# Patient Record
Sex: FEMALE | Race: WHITE | URBAN - METROPOLITAN AREA
[De-identification: names, ages, dates, MRNs, and addresses within clinical notes are randomized per-mention and may not be internally consistent; named-entity substitution may affect disease eponyms.]

---

## 2019-04-24 ENCOUNTER — OUTPATIENT (OUTPATIENT)
Dept: OUTPATIENT SERVICES | Facility: HOSPITAL | Age: 49
LOS: 1 days | End: 2019-04-24
Payer: MEDICARE

## 2019-04-24 ENCOUNTER — APPOINTMENT (OUTPATIENT)
Dept: PODIATRY | Facility: HOSPITAL | Age: 49
End: 2019-04-24

## 2019-04-24 VITALS — RESPIRATION RATE: 14 BRPM | DIASTOLIC BLOOD PRESSURE: 82 MMHG | HEART RATE: 75 BPM | SYSTOLIC BLOOD PRESSURE: 120 MMHG

## 2019-04-24 DIAGNOSIS — Q66.6 OTHER CONGENITAL VALGUS DEFORMITIES OF FEET: ICD-10-CM

## 2019-04-24 DIAGNOSIS — Q90.9 DOWN SYNDROME, UNSPECIFIED: ICD-10-CM

## 2019-04-24 DIAGNOSIS — M79.609 PAIN IN UNSPECIFIED LIMB: ICD-10-CM

## 2019-04-24 DIAGNOSIS — M21.611 BUNION OF RIGHT FOOT: ICD-10-CM

## 2019-04-24 PROCEDURE — G0463: CPT

## 2019-04-24 PROCEDURE — 73630 X-RAY EXAM OF FOOT: CPT

## 2019-04-24 PROCEDURE — 73630 X-RAY EXAM OF FOOT: CPT | Mod: 26,50

## 2019-04-24 NOTE — ASSESSMENT
[FreeTextEntry1] : 49yo pt w/ bilateral hypermobile flatfeet w/ bunion deformity\par -Pt seen evaluated\par -Pt to receive custom molded orthotics & shoes in order to accommodate bunion deformity & alleviate bunion related pain\par -Discussed w/ pt that if unsuccessful conservative treatment is an option, surgery may be required but would need to have a reasonable at home situation in order to accommodate possible non weight bearing status dependent on procedure\par -Consent to return for callus debridement, #10 used to debride no incidents\par -XR ordered\par -RTC 3mos

## 2019-04-24 NOTE — PHYSICAL EXAM
[Skin Turgor] : normal skin turgor [] : no rash [Full Pulse] : the pedal pulses are present [FreeTextEntry1] : irritation to medial eminence b/l, no open lesions, no edema

## 2019-04-24 NOTE — HISTORY OF PRESENT ILLNESS
[FreeTextEntry1] : Pt presents w/ aide for evaluation of painful bunions b/l. Pt is nonverbal during evaluation, aide states that she has no medical issues other than MR. Pt has pain ambulating although does not necessarily convey the amount or level to her aides. Limited history available per aide.

## 2019-07-17 ENCOUNTER — APPOINTMENT (OUTPATIENT)
Dept: PODIATRY | Facility: HOSPITAL | Age: 49
End: 2019-07-17

## 2019-07-17 ENCOUNTER — OUTPATIENT (OUTPATIENT)
Dept: OUTPATIENT SERVICES | Facility: HOSPITAL | Age: 49
LOS: 1 days | End: 2019-07-17
Payer: MEDICARE

## 2019-07-17 VITALS
DIASTOLIC BLOOD PRESSURE: 75 MMHG | HEIGHT: 57 IN | BODY MASS INDEX: 25.24 KG/M2 | WEIGHT: 117 LBS | RESPIRATION RATE: 14 BRPM | HEART RATE: 78 BPM | SYSTOLIC BLOOD PRESSURE: 113 MMHG

## 2019-07-17 DIAGNOSIS — M21.612 BUNION OF RIGHT FOOT: ICD-10-CM

## 2019-07-17 DIAGNOSIS — M79.609 PAIN IN UNSPECIFIED LIMB: ICD-10-CM

## 2019-07-17 DIAGNOSIS — M21.611 BUNION OF RIGHT FOOT: ICD-10-CM

## 2019-07-17 DIAGNOSIS — L84 CORNS AND CALLOSITIES: ICD-10-CM

## 2019-07-17 DIAGNOSIS — Q66.6 OTHER CONGENITAL VALGUS DEFORMITIES OF FEET: ICD-10-CM

## 2019-07-17 PROCEDURE — G0463: CPT

## 2019-07-17 NOTE — PHYSICAL EXAM
[Full Pulse] : the pedal pulses are present [Skin Turgor] : normal skin turgor [] : no rash [FreeTextEntry1] : not able to assess based on nonverbal pt

## 2019-07-17 NOTE — ASSESSMENT
[FreeTextEntry1] : 49yo pt w/ bilateral hypermobile flatfeet w/ bunion deformity\par -Pt seen evaluated\par -Pt to receive custom molded orthotics & shoes in order to accommodate bunion deformity & alleviate bunion related pain\par -Discussed w/ pt that if unsuccessful conservative treatment is an option, surgery may be required but would need to have a reasonable at home situation in order to accommodate possible non weight bearing status dependent on procedure\par -Consent to return for callus debridement, #10 used to debrided no incidents\par -RTC 1 year

## 2019-07-19 DIAGNOSIS — M21.611 BUNION OF RIGHT FOOT: ICD-10-CM

## 2019-07-19 DIAGNOSIS — L84 CORNS AND CALLOSITIES: ICD-10-CM

## 2019-07-19 DIAGNOSIS — Q90.9 DOWN SYNDROME, UNSPECIFIED: ICD-10-CM

## 2019-07-19 DIAGNOSIS — Q66.6 OTHER CONGENITAL VALGUS DEFORMITIES OF FEET: ICD-10-CM

## 2019-11-25 ENCOUNTER — RESULT REVIEW (OUTPATIENT)
Age: 49
End: 2019-11-25

## 2019-11-25 ENCOUNTER — OUTPATIENT (OUTPATIENT)
Dept: OUTPATIENT SERVICES | Facility: HOSPITAL | Age: 49
LOS: 1 days | End: 2019-11-25

## 2019-11-25 ENCOUNTER — APPOINTMENT (OUTPATIENT)
Dept: OBGYN | Facility: HOSPITAL | Age: 49
End: 2019-11-25
Payer: MEDICARE

## 2019-11-25 ENCOUNTER — LABORATORY RESULT (OUTPATIENT)
Age: 49
End: 2019-11-25

## 2019-11-25 VITALS
HEIGHT: 58 IN | WEIGHT: 110 LBS | HEART RATE: 77 BPM | BODY MASS INDEX: 23.09 KG/M2 | SYSTOLIC BLOOD PRESSURE: 115 MMHG | DIASTOLIC BLOOD PRESSURE: 70 MMHG

## 2019-11-25 DIAGNOSIS — Z00.00 ENCOUNTER FOR GENERAL ADULT MEDICAL EXAMINATION W/OUT ABNORMAL FINDINGS: ICD-10-CM

## 2019-11-25 DIAGNOSIS — Q90.9 DOWN SYNDROME, UNSPECIFIED: ICD-10-CM

## 2019-11-25 PROCEDURE — 99202 OFFICE O/P NEW SF 15 MIN: CPT | Mod: 25

## 2019-11-26 DIAGNOSIS — Q90.9 DOWN SYNDROME, UNSPECIFIED: ICD-10-CM

## 2019-11-26 DIAGNOSIS — Z00.00 ENCOUNTER FOR GENERAL ADULT MEDICAL EXAMINATION WITHOUT ABNORMAL FINDINGS: ICD-10-CM

## 2019-11-26 LAB
C TRACH RRNA SPEC QL NAA+PROBE: SIGNIFICANT CHANGE UP
HPV HIGH+LOW RISK DNA PNL CVX: SIGNIFICANT CHANGE UP
N GONORRHOEA RRNA SPEC QL NAA+PROBE: SIGNIFICANT CHANGE UP
SPECIMEN SOURCE: SIGNIFICANT CHANGE UP

## 2019-11-30 LAB — CYTOLOGY SPEC DOC CYTO: SIGNIFICANT CHANGE UP

## 2019-12-16 NOTE — PHYSICAL EXAM
[Alert] : alert [No Lesions] : no genitalia lesions [Normal] : clitoris [Labia Majora] : labia major [Pink Rugae] : pink rugae [Pap Obtained] : a Pap smear was performed [Nulliparous] : was nulliparous [Normal Position] : in a normal position [RRR, No Murmurs] : RRR, no murmurs [CTAB] : CTAB [Acute Distress] : no acute distress [Mass] : no breast mass [Axillary LAD] : no axillary lymphadenopathy [Nipple Discharge] : no nipple discharge [Soft] : not soft [Tender] : non tender [Labia Majora Erythema] : no erythema of the labia majora [Labia Minora Erythema] : no erythema of the labia minora [Atrophy] : no atrophy [Cystocele] : no cystocele [Discharge] : no discharge [Motion Tenderness] : there was no cervical motion tenderness

## 2019-12-20 ENCOUNTER — APPOINTMENT (OUTPATIENT)
Dept: MAMMOGRAPHY | Facility: CLINIC | Age: 49
End: 2019-12-20

## 2020-02-11 ENCOUNTER — APPOINTMENT (OUTPATIENT)
Dept: MAMMOGRAPHY | Facility: CLINIC | Age: 50
End: 2020-02-11

## 2020-04-16 ENCOUNTER — APPOINTMENT (OUTPATIENT)
Dept: GASTROENTEROLOGY | Facility: CLINIC | Age: 50
End: 2020-04-16

## 2021-12-08 ENCOUNTER — APPOINTMENT (OUTPATIENT)
Dept: OBGYN | Facility: HOSPITAL | Age: 51
End: 2021-12-08

## 2022-04-12 ENCOUNTER — APPOINTMENT (OUTPATIENT)
Dept: OPHTHALMOLOGY | Facility: CLINIC | Age: 52
End: 2022-04-12

## 2022-05-10 ENCOUNTER — NON-APPOINTMENT (OUTPATIENT)
Age: 52
End: 2022-05-10

## 2022-05-10 ENCOUNTER — APPOINTMENT (OUTPATIENT)
Dept: OPHTHALMOLOGY | Facility: CLINIC | Age: 52
End: 2022-05-10
Payer: MEDICARE

## 2022-05-10 PROCEDURE — 92004 COMPRE OPH EXAM NEW PT 1/>: CPT

## 2023-11-01 ENCOUNTER — APPOINTMENT (OUTPATIENT)
Dept: OPHTHALMOLOGY | Facility: CLINIC | Age: 53
End: 2023-11-01
Payer: MEDICARE

## 2023-11-01 ENCOUNTER — NON-APPOINTMENT (OUTPATIENT)
Age: 53
End: 2023-11-01

## 2023-11-01 PROCEDURE — 92014 COMPRE OPH EXAM EST PT 1/>: CPT

## 2023-12-05 ENCOUNTER — APPOINTMENT (OUTPATIENT)
Age: 53
End: 2023-12-05
Payer: MEDICAID

## 2023-12-05 PROCEDURE — ZZZZZ: CPT

## 2024-06-11 ENCOUNTER — APPOINTMENT (OUTPATIENT)
Age: 54
End: 2024-06-11
Payer: MEDICAID

## 2024-06-11 PROCEDURE — ZZZZZ: CPT

## 2024-11-19 ENCOUNTER — APPOINTMENT (OUTPATIENT)
Age: 54
End: 2024-11-19
Payer: MEDICAID

## 2024-11-19 PROCEDURE — ZZZZZ: CPT

## 2024-12-09 ENCOUNTER — APPOINTMENT (OUTPATIENT)
Dept: OPHTHALMOLOGY | Facility: CLINIC | Age: 54
End: 2024-12-09

## 2024-12-24 ENCOUNTER — APPOINTMENT (OUTPATIENT)
Age: 54
End: 2024-12-24
Payer: MEDICAID

## 2024-12-24 PROCEDURE — ZZZZZ: CPT

## 2025-01-14 ENCOUNTER — NON-APPOINTMENT (OUTPATIENT)
Age: 55
End: 2025-01-14

## 2025-09-08 ENCOUNTER — APPOINTMENT (OUTPATIENT)
Dept: OPHTHALMOLOGY | Facility: CLINIC | Age: 55
End: 2025-09-08
Payer: MEDICARE

## 2025-09-08 ENCOUNTER — NON-APPOINTMENT (OUTPATIENT)
Age: 55
End: 2025-09-08

## 2025-09-08 PROCEDURE — 92014 COMPRE OPH EXAM EST PT 1/>: CPT
